# Patient Record
Sex: FEMALE | Race: WHITE | NOT HISPANIC OR LATINO | Employment: FULL TIME | URBAN - METROPOLITAN AREA
[De-identification: names, ages, dates, MRNs, and addresses within clinical notes are randomized per-mention and may not be internally consistent; named-entity substitution may affect disease eponyms.]

---

## 2023-10-23 ENCOUNTER — OFFICE VISIT (OUTPATIENT)
Dept: FAMILY MEDICINE CLINIC | Facility: CLINIC | Age: 33
End: 2023-10-23
Payer: COMMERCIAL

## 2023-10-23 VITALS
BODY MASS INDEX: 28.89 KG/M2 | DIASTOLIC BLOOD PRESSURE: 75 MMHG | HEIGHT: 62 IN | HEART RATE: 70 BPM | SYSTOLIC BLOOD PRESSURE: 126 MMHG | OXYGEN SATURATION: 99 % | WEIGHT: 157 LBS

## 2023-10-23 DIAGNOSIS — R73.03 PREDIABETES: ICD-10-CM

## 2023-10-23 DIAGNOSIS — Z23 ENCOUNTER FOR IMMUNIZATION: ICD-10-CM

## 2023-10-23 DIAGNOSIS — E78.2 MODERATE MIXED HYPERLIPIDEMIA NOT REQUIRING STATIN THERAPY: ICD-10-CM

## 2023-10-23 DIAGNOSIS — I10 MILD HYPERTENSION: ICD-10-CM

## 2023-10-23 DIAGNOSIS — G43.009 MIGRAINE WITHOUT AURA AND WITHOUT STATUS MIGRAINOSUS, NOT INTRACTABLE: ICD-10-CM

## 2023-10-23 DIAGNOSIS — Z76.89 ENCOUNTER TO ESTABLISH CARE WITH NEW DOCTOR: ICD-10-CM

## 2023-10-23 DIAGNOSIS — R53.83 OTHER FATIGUE: Primary | ICD-10-CM

## 2023-10-23 PROBLEM — L03.891: Status: RESOLVED | Noted: 2018-01-30 | Resolved: 2023-10-23

## 2023-10-23 PROBLEM — F41.8 ANXIETY WITH DEPRESSION: Status: RESOLVED | Noted: 2019-02-05 | Resolved: 2023-10-23

## 2023-10-23 NOTE — ASSESSMENT & PLAN NOTE
Blood pressure normotensive today, prior hx of elevated BP. Not currently on anti-HTN medications. Goal blood pressure is less than 140/90.   - pt advised to get automatic arm cuff and checking blood pressure every 2-3 days at home. Counseled to place the cuff on bare skin, rest arm at the level of the heart, keep legs uncrossed and feet flat on the ground. Do not talk while blood pressure is being checked.   - If BP consistently >140/90 will need HTN med initiation

## 2023-10-23 NOTE — ASSESSMENT & PLAN NOTE
No red flags on history or exam today. Discussed diet and lifestyle modifications.  Repeat labs pending

## 2023-10-23 NOTE — PATIENT INSTRUCTIONS
Today: Update screening labs and vaccinations. Can schedule pap with me.     Meds: none    Referrals: none    Imaging: none    Healthy lifestyle tips  - Reduce intake of processed food (shop perimeter of grocery store), especially white flour and sugar  - Increase intake of fruits/veggies  - Drink 32-64oz of water a day  - Quit smoking if you smoke  - Drink no more than 2 alcoholic beverages/day if you are male, no more than 1 alcoholic beverage/day if you are female  - Get 6-8 hours of restful sleep at night- poor sleep quality has been linked to increased risk of cardiovascular disease  - Voluntary physical activity cumulative 120-150 minutes/week  - Stress management utilizing meditation and mindfulness (InsightTimer, free chito)  - Keep blood pressure < 140/90. Get automatic arm cuff and check 2-3x weekly and keep log.     Heart healthy diet from AHA: https://www.heart.org/en/healthy-living/healthy-eating/eat-smart/nutrition-basics/aha-diet-and-lifestyle-recommendations

## 2023-10-23 NOTE — ASSESSMENT & PLAN NOTE
Reviewed chart, patient was never advised of abnormal A1c last year.  Will repeat A1c and discussed with patient if persistently elevated.  Diet and exercise guidance provided in AVS.

## 2023-10-24 DIAGNOSIS — E11.65 TYPE 2 DIABETES MELLITUS WITH HYPERGLYCEMIA, WITHOUT LONG-TERM CURRENT USE OF INSULIN: ICD-10-CM

## 2023-10-24 DIAGNOSIS — E78.2 MODERATE MIXED HYPERLIPIDEMIA NOT REQUIRING STATIN THERAPY: Primary | ICD-10-CM

## 2023-10-24 DIAGNOSIS — R79.9 ABNORMAL BLOOD CHEMISTRY TEST: ICD-10-CM

## 2023-10-24 PROBLEM — E55.9 VITAMIN D DEFICIENCY: Status: ACTIVE | Noted: 2023-10-24

## 2023-10-24 PROBLEM — R73.03 PREDIABETES: Status: RESOLVED | Noted: 2023-10-23 | Resolved: 2023-10-24

## 2023-10-24 LAB
25(OH)D3+25(OH)D2 SERPL-MCNC: 23.3 NG/ML (ref 30–100)
ALBUMIN SERPL-MCNC: 4.6 G/DL (ref 3.9–4.9)
ALBUMIN/GLOB SERPL: 1.7 {RATIO} (ref 1.2–2.2)
ALP SERPL-CCNC: 68 IU/L (ref 44–121)
ALT SERPL-CCNC: 12 IU/L (ref 0–32)
AST SERPL-CCNC: 12 IU/L (ref 0–40)
BASOPHILS # BLD AUTO: 0 X10E3/UL (ref 0–0.2)
BASOPHILS NFR BLD AUTO: 1 %
BILIRUB SERPL-MCNC: 0.4 MG/DL (ref 0–1.2)
BUN SERPL-MCNC: 11 MG/DL (ref 6–20)
BUN/CREAT SERPL: 15 (ref 9–23)
CALCIUM SERPL-MCNC: 9.2 MG/DL (ref 8.7–10.2)
CHLORIDE SERPL-SCNC: 103 MMOL/L (ref 96–106)
CHOLEST SERPL-MCNC: 204 MG/DL (ref 100–199)
CO2 SERPL-SCNC: 24 MMOL/L (ref 20–29)
CREAT SERPL-MCNC: 0.75 MG/DL (ref 0.57–1)
EGFRCR SERPLBLD CKD-EPI 2021: 107 ML/MIN/1.73
EOSINOPHIL # BLD AUTO: 0.2 X10E3/UL (ref 0–0.4)
EOSINOPHIL NFR BLD AUTO: 3 %
ERYTHROCYTE [DISTWIDTH] IN BLOOD BY AUTOMATED COUNT: 11.8 % (ref 11.7–15.4)
FERRITIN SERPL-MCNC: 28 NG/ML (ref 15–150)
GLOBULIN SER CALC-MCNC: 2.7 G/DL (ref 1.5–4.5)
GLUCOSE SERPL-MCNC: 141 MG/DL (ref 70–99)
HBA1C MFR BLD: 6.5 % (ref 4.8–5.6)
HCT VFR BLD AUTO: 38.3 % (ref 34–46.6)
HDLC SERPL-MCNC: 66 MG/DL
HGB BLD-MCNC: 13 G/DL (ref 11.1–15.9)
IMM GRANULOCYTES # BLD AUTO: 0 X10E3/UL (ref 0–0.1)
IMM GRANULOCYTES NFR BLD AUTO: 0 %
LDLC SERPL CALC-MCNC: 129 MG/DL (ref 0–99)
LYMPHOCYTES # BLD AUTO: 1.8 X10E3/UL (ref 0.7–3.1)
LYMPHOCYTES NFR BLD AUTO: 28 %
MCH RBC QN AUTO: 30.7 PG (ref 26.6–33)
MCHC RBC AUTO-ENTMCNC: 33.9 G/DL (ref 31.5–35.7)
MCV RBC AUTO: 90 FL (ref 79–97)
MONOCYTES # BLD AUTO: 0.5 X10E3/UL (ref 0.1–0.9)
MONOCYTES NFR BLD AUTO: 8 %
NEUTROPHILS # BLD AUTO: 3.8 X10E3/UL (ref 1.4–7)
NEUTROPHILS NFR BLD AUTO: 60 %
PLATELET # BLD AUTO: 277 X10E3/UL (ref 150–450)
POTASSIUM SERPL-SCNC: 4.4 MMOL/L (ref 3.5–5.2)
PROT SERPL-MCNC: 7.3 G/DL (ref 6–8.5)
RBC # BLD AUTO: 4.24 X10E6/UL (ref 3.77–5.28)
SODIUM SERPL-SCNC: 141 MMOL/L (ref 134–144)
TRIGL SERPL-MCNC: 50 MG/DL (ref 0–149)
TSH SERPL DL<=0.005 MIU/L-ACNC: 2.72 UIU/ML (ref 0.45–4.5)
VLDLC SERPL CALC-MCNC: 9 MG/DL (ref 5–40)
WBC # BLD AUTO: 6.3 X10E3/UL (ref 3.4–10.8)

## 2023-10-24 RX ORDER — METFORMIN HYDROCHLORIDE 500 MG/1
500 TABLET, EXTENDED RELEASE ORAL 2 TIMES DAILY
Qty: 180 TABLET | Refills: 3 | Status: SHIPPED | OUTPATIENT
Start: 2023-10-24

## 2023-10-24 NOTE — PROGRESS NOTES
Hello!    Here are the results of your most recent labs:    Your CBC and Thyroid Function was all normal.     Your vit D, ferritin, Cholesterol, Comprehensive Metabolic Panel, and Hgb A1C was abnormal.     I recommend supplementing with OTC vitamin D 1000 IU daily for 3-6 months, then take 400 IU daily or a prenatal vitamin.     Your ferritin was 28, goal is 50-75. I recommend oral iron supplementation for 12-16 weeks. You can get OTC iron 325mg (the bottle may say 65mg elemental iron), any brand is ok, and take one every day. I recommend taking iron with a vitamin C containing beverage such as orange juice to enhance absorption. I fpossible, avoid dairy, Tums, and coffee/tea around when you take iron as they may decrease absorption. Common side effects of iron supplementation include some stomach upset and constipation. Be sure to drink enough water and eat enough fiber when taking iron, you may use miralax if needed for constipation. Your poop may be dark green while taking iron, that is normal. You should notice an improvement within 6-8 weeks of daily iron supplementation as it takes a bit for the body to rebuild the stores it needs. If you do not notice an improvement, please let me know and we can repeat labs.      Your cholesterol was elevated.  For diet and lifestyle intervention, I recommend decreasing intake of trans and saturated fats, and red meat.  Increase physical activity as tolerated.  You may try supplementing with omega-3 1-2g daily, berberine 500mg daily, and/or whole flaxseed if desired.    Your A1C (screening test for diabetes) was elevated, indicating that you have diabetes. It was in the diabetic range last year as well. For diet and lifestyle interventions I recommend decreasing carbohydrate intake to 100-150 g/day, reducing processed food, increasing fruit and vegetable intake, at least 120 minutes of physical activity per week as tolerated, and controlling blood pressure with a goal of  less than 120/80. I recommend starting metformin 500mg twice daily to control your blood sugar. Med ordered. Your blood sugar was elevated on your CMP, consistent with diabetes.     Follow-up in 3 months with repeat of labs prior to appointment so we can go over them, ordered bloodwork to be done at our [place] facility by the clinic- please be fasting (no food after midnight, water/meds/black coffee ok).       Please continue your current medications.  Please contact me with any questions.    Thank you!  Dr. Fairchild

## 2023-10-27 ENCOUNTER — PATIENT ROUNDING (BHMG ONLY) (OUTPATIENT)
Dept: FAMILY MEDICINE CLINIC | Facility: CLINIC | Age: 33
End: 2023-10-27
Payer: COMMERCIAL

## 2023-10-27 NOTE — PROGRESS NOTES
Good morning Ms. Justin. My name is Nilsa and I am the practice manager for Dr. Fairchild's office.   I hope your recent visit with our office went smoothly.  If you  have questions or concerns you may reach me at [phone].     Thank you and have a great day!

## 2024-03-08 ENCOUNTER — OFFICE VISIT (OUTPATIENT)
Dept: FAMILY MEDICINE CLINIC | Facility: CLINIC | Age: 34
End: 2024-03-08
Payer: COMMERCIAL

## 2024-03-08 VITALS
OXYGEN SATURATION: 98 % | WEIGHT: 151 LBS | DIASTOLIC BLOOD PRESSURE: 84 MMHG | HEIGHT: 62 IN | HEART RATE: 66 BPM | SYSTOLIC BLOOD PRESSURE: 119 MMHG | BODY MASS INDEX: 27.79 KG/M2

## 2024-03-08 DIAGNOSIS — E78.2 MODERATE MIXED HYPERLIPIDEMIA NOT REQUIRING STATIN THERAPY: ICD-10-CM

## 2024-03-08 DIAGNOSIS — E55.9 VITAMIN D DEFICIENCY: ICD-10-CM

## 2024-03-08 DIAGNOSIS — E11.65 TYPE 2 DIABETES MELLITUS WITH HYPERGLYCEMIA, WITHOUT LONG-TERM CURRENT USE OF INSULIN: Primary | ICD-10-CM

## 2024-03-08 DIAGNOSIS — Z01.419 ENCOUNTER FOR ROUTINE GYNECOLOGICAL EXAMINATION WITH PAPANICOLAOU SMEAR OF CERVIX: ICD-10-CM

## 2024-03-08 DIAGNOSIS — I10 MILD HYPERTENSION: ICD-10-CM

## 2024-03-08 DIAGNOSIS — Z23 ENCOUNTER FOR IMMUNIZATION: ICD-10-CM

## 2024-03-08 LAB
ACYLCARNITINE/C0 UR-RTO: NORMAL {RATIO}
EXPIRATION DATE: NORMAL
Lab: NORMAL
POC CREATININE URINE: NORMAL
POC MICROALBUMIN URINE: NORMAL

## 2024-03-08 NOTE — PROGRESS NOTES
Chief Complaint  Chief Complaint   Patient presents with    Gynecologic Exam    Diabetes    Hypertension    Hyperlipidemia       Subjective    History of Present Illness  Brittney Justin is a 33 y.o. female presents to [facility] for well woman exam.  There are no acute concerns today.   The patient is sexually active with male partners. There is no pain or bleeding with intercourse.  The current method of family planning is vasectomy.  Patient has been pregnant 1 times. Patient has given birth 1 times. There were no complications.  There is no incontinence with laughing/sneezing/running/jumping.  There is no pelvic pain.   There is no vaginal discharge.   There is no dysuria.  There is no abnormal breast lumps or discharge. Patient is doing self-exams.          No LMP recorded.    Current Outpatient Medications on File Prior to Visit   Medication Sig Dispense Refill    metFORMIN ER (GLUCOPHAGE-XR) 500 MG 24 hr tablet Take 1 tablet by mouth 2 (Two) Times a Day. Indications: Type 2 Diabetes 180 tablet 3     No current facility-administered medications on file prior to visit.       Patient Active Problem List   Diagnosis    Mild hypertension    Migraine without aura and without status migrainosus, not intractable    Moderate mixed hyperlipidemia not requiring statin therapy    Other fatigue    Type 2 diabetes mellitus with hyperglycemia, without long-term current use of insulin    Vitamin D deficiency       Past Medical History:   Diagnosis Date    Acute lymphangitis of scalp     Anxiety with depression     During divorce, many years, took meds but don't remember which ones    Prediabetes     Prediabetic A1c x 3 years, last A1c 6.6 in , was never told she has diabetes       Past Surgical History:   Procedure Laterality Date    WISDOM TOOTH EXTRACTION Bilateral        Family History   Problem Relation Age of Onset    No Known Problems Mother     Alcohol abuse Father          57     Other Daughter         Rett syndrome    Breast cancer Paternal Aunt 60    Dementia Paternal Grandmother     Heart disease Paternal Grandfather        Health Maintenance Summary            Ordered - PAP SMEAR (Every 3 Years) Ordered on 3/8/2024      10/23/2023  Postponed until 12/15/2023 by Susan Fairchild MD (Pending event - scheduled)              Ordered - DIABETIC EYE EXAM (Yearly) Ordered on 3/8/2024      No completion, postpone, or frequency change history exists for this topic.              Postponed - Hepatitis B (1 of 3 - 19+ 3-dose series) Postponed until 3/8/2024      03/08/2024  Postponed until 3/8/2024 by Susan Fairchild MD (Pending event)              Postponed - COVID-19 Vaccine (2 - 2023-24 season) Postponed until 5/28/2024 03/08/2024  Postponed until 5/28/2024 by Susan Fairchild MD (Product Unavailable)    10/23/2023  Postponed until 1/12/2024 by Susan Fairchild MD (Product Unavailable)    01/10/2022  Imm Admin: COVID-19 (PFIZER) Purple Cap Monovalent              Ordered - HEMOGLOBIN A1C (Every 6 Months) Ordered on 3/8/2024      10/23/2023  Hemoglobin A1C component of Hemoglobin A1c    11/10/2022  Hemoglobin A1C component of HEMOGLOBIN A1C    08/17/2021  Hemoglobin A1C component of HEMOGLOBIN A1C    10/29/2019  Hemoglobin A1C component of Hemoglobin A1c    02/12/2019  Hemoglobin A1c    Only the first 5 history entries have been loaded, but more history exists.              ANNUAL PHYSICAL (Yearly) Next due on 10/23/2024      10/23/2023  Done              Ordered - LIPID PANEL (Yearly) Ordered on 3/8/2024      10/23/2023  Lipid Panel    11/10/2022  LIPID PANEL    08/17/2021  LIPID PANEL    02/12/2019  Lipid Panel    10/13/2018  Lipid Panel    Only the first 5 history entries have been loaded, but more history exists.              BMI FOLLOWUP (Yearly) Next due on 10/23/2024      10/23/2023  SmartData: BMI EDUCATION FOR OVERWEIGHT    10/23/2023  SmartData: WORKFLOW - QUALITY MEASUREMENT -  "DOCUMENTED WEIGHT FOLLOW-UP PLAN              DIABETIC FOOT EXAM (Yearly) Next due on 3/8/2025      03/08/2024  Done              URINE MICROALBUMIN (Yearly) Next due on 3/8/2025      03/08/2024  Microalbumin, Urine component of POC Microalbumin              TDAP/TD VACCINES (2 - Td or Tdap) Next due on 8/11/2031 08/11/2021  Imm Admin: Tdap              HEPATITIS C SCREENING  Completed      10/13/2017  Hepatitis Panel, Acute              INFLUENZA VACCINE  Completed      10/23/2023  Imm Admin: Fluzone (or Fluarix & Flulaval for VFC) >6mos    11/10/2022  Imm Admin: Fluzone (or Fluarix & Flulaval for VFC) >6mos    10/29/2019  Imm Admin: flucelvax quad pfs =>4 YRS              Pneumococcal Vaccine 0-64 (Series Information) Completed      03/08/2024  Postponed until 3/8/2024 by Susan Fairchild MD (Pending event)    03/08/2024  Imm Admin: Pneumococcal Conjugate 20-Valent (PCV20)                     Objective   Vitals:    03/08/24 0845   BP: 119/84   Pulse: 66   SpO2: 98%   Weight: 68.5 kg (151 lb)   Height: 157.5 cm (62.01\")      Physical exam  HEENT normal. PERRLA. MMM. Neck supple. Normal respiratory effort. Well perfused. Abdomen soft. Extremities show no edema, normal range of motion. Normal gait, no focal neurological findings.    BREAST EXAM: risk and benefit of breast self-exam was discussed, not examined    PELVIC EXAM: normal external genitalia, vulva, vagina, cervix with cyst at 12 o clock; PAP: Pap smear done today, thin-prep method    Diabetic foot exam:   Left: Filament test present 10/10   Right: Filament test present 10/10      The following data was reviewed by: Susan Fairchild MD on 03/08/2024:  CMP          10/23/2023    00:00   CMP   Glucose 141    BUN 11    Creatinine 0.75    Sodium 141    Potassium 4.4    Chloride 103    Calcium 9.2    Total Protein 7.3    Albumin 4.6    Globulin 2.7    Total Bilirubin 0.4    Alkaline Phosphatase 68    AST (SGOT) 12    ALT (SGPT) 12    BUN/Creatinine Ratio 15  "     CBC          10/23/2023    00:00   CBC   WBC 6.3    RBC 4.24    Hemoglobin 13.0    Hematocrit 38.3    MCV 90    MCH 30.7    MCHC 33.9    RDW 11.8    Platelets 277      Lipid Panel          10/23/2023    00:00   Lipid Panel   Total Cholesterol 204    Triglycerides 50    HDL Cholesterol 66    VLDL Cholesterol 9    LDL Cholesterol  129      TSH          10/23/2023    00:00   TSH   TSH 2.720      Most Recent A1C          10/23/2023    00:00   HGBA1C Most Recent   Hemoglobin A1C 6.5        Last PCP note      Assessment and Plan  Brittney Justin is a 33 y.o. female presents to [facility] today for well woman, chart reviewed and updated, medications reviewed, labs reviewed, preventative med reviewed. Pt is currently doing well, no concerning findings on history or exam. Answered all questions at this time, pt expressed no further concerns today.      Preventative medicine:   HIV Screen - N/A  STI screening: N/A  Immunizations UTD: No- due next COVID and Hep B series; will defer  Tobacco cessation counseling: N/A  Pap: Up to date.  Pending result. Next pap due in 5 years in accordance with ASCCP guidelines if normal today.   Mammogram:  age 38    Osteoporosis Screen:  age 63      Diagnoses and all orders for this visit:    1. Type 2 diabetes mellitus with hyperglycemia, without long-term current use of insulin (Primary)  Assessment & Plan:   A1c remains below goal on current regimen.  Well controlled.  There are no diabetic complications.    - continue current DM management  - Strongly advised being proactive in preventing progression, including continuing to improve diet (including decreasing carbs, monitoring calories, increasing unprocessed fresh foods, and avoiding processed sugar and flour) and goal of 120 minutes of physical activity weekly  - Discussed modifiable risk factors (BP<140/90, fasting BG , HgA1C<7, LDL<100, BMI 20-25)  - Discussed need for annual ophthalmology exam for  surveillance of retinopathy; and need for annual as well as twice daily foot exams for neuropathy surveillance  - f/u in clinic 6-12 months for re-check, with repeat of labs at that time.     Above plan was individualized and constructed with patient input and agreement    last ophthalmology exam -  ordered  microalbumin screen -  ordered  lipid panel - done in last 12 mo, LDL is not below 70  Hgb A1C -  goal for this patient is below 7%  foot exam - was done and was NEGATIVE   ACE Inhibitor/ARB - N/A  Statin- N/A  Flu shot- up to date and documented  Pneumovax- up to date and documented  Aspirin- Yes and N/A    Orders:  -     Pneumococcal Conjugate Vaccine 20-Valent All  -     CBC & Differential  -     Comprehensive Metabolic Panel  -     Hemoglobin A1c  -     POC Microalbumin  -     Ambulatory Referral for Diabetic Eye Exam-Optometry    2. Moderate mixed hyperlipidemia not requiring statin therapy  Assessment & Plan:  Elevated cholesterol on labs. No red flags on history or exam.  No statin indicated. Discussed diet and lifestyle modifications. Recheck lipid pending    Orders:  -     Lipid Panel    3. Mild hypertension  Overview:  Patient reports she has had intermittently high blood pressure.  But has never been formally diagnosed with hypertension.  Is not checking blood pressure at home    Assessment & Plan:  BP normotensive today, will cont to monitor    Orders:  -     Comprehensive Metabolic Panel  -     POC Microalbumin    4. Vitamin D deficiency  -     Vitamin D,25-Hydroxy    5. Encounter for immunization  -     Pneumococcal Conjugate Vaccine 20-Valent All    6. Encounter for routine gynecological examination with Papanicolaou smear of cervix  -     IGP, Aptima HPV, Rfx 16 / 18,45        Patient voiced understanding and agreement with plan of care and had no further questions or concerns at this time.     Susan Fairchild MD  Family Medicine  [facility]      Follow Up  Return in about 6  months (around 9/8/2024) for Annual.    Patient Instructions   Today: Update labs and vaccination; if pap is normal, you will be due in 5 years    Meds: No changes    Referrals: optometry- diabetic eye exam    Imaging: none    HTN plan  - The symptoms of organ damage from hypertension are very subtle until there is significant damage, so prevention is key through lifestyle modifications: reduce salt intake to less than 1500mg daily (about a teaspoon), , increase intake of unprocessed fresh foods, avoid processed sugar and flour, work up to 120 minutes of exercise weekly as tolerated. Consider trying the DASH diet, or Mediterranean diet  - Goal BP: <140/90  - go to the optometrist every year to monitor for retinopathy  -  Check BP every day with automatic arm cuff. Ensure BP cuff is on bare skin, you are seated with feet flat on ground and legs uncrossed, the arm is at the level of the heart, and you are not talking during the BP measurement. Keep a log of your BP and bring it to your next appointment.     Check out https://www.heart.org/en/health-topics/high-blood-pressure    Diabetes plan:   - be proactive in preventing progression:  continuing to improve diet (decreasing carbs to goal 100-120g/day, monitoring calories, increasing unprocessed fresh foods, and avoiding processed sugar and flour) and 120 minutes of exercise weekly.  - Goal BP <140/90,   - Goal fasting BG   - Goal A1C  <7  - Goal LDL <80  - Ensure annual ophthalmology exam for surveillance of retinopathy  - twice daily foot exams for neuropathy surveillance    Check out [email or web address].[email or web address]

## 2024-03-08 NOTE — ASSESSMENT & PLAN NOTE
Elevated cholesterol on labs. No red flags on history or exam.  No statin indicated. Discussed diet and lifestyle modifications. Recheck lipid pending

## 2024-03-08 NOTE — ASSESSMENT & PLAN NOTE
A1c remains below goal on current regimen.  Well controlled.  There are no diabetic complications.    - continue current DM management  - Strongly advised being proactive in preventing progression, including continuing to improve diet (including decreasing carbs, monitoring calories, increasing unprocessed fresh foods, and avoiding processed sugar and flour) and goal of 120 minutes of physical activity weekly  - Discussed modifiable risk factors (BP<140/90, fasting BG , HgA1C<7, LDL<100, BMI 20-25)  - Discussed need for annual ophthalmology exam for surveillance of retinopathy; and need for annual as well as twice daily foot exams for neuropathy surveillance  - f/u in clinic 6-12 months for re-check, with repeat of labs at that time.     Above plan was individualized and constructed with patient input and agreement    last ophthalmology exam -  ordered  microalbumin screen -  ordered  lipid panel - done in last 12 mo, LDL is not below 70  Hgb A1C -  goal for this patient is below 7%  foot exam - was done and was NEGATIVE   ACE Inhibitor/ARB - N/A  Statin- N/A  Flu shot- up to date and documented  Pneumovax- up to date and documented  Aspirin- Yes and N/A

## 2024-03-08 NOTE — PATIENT INSTRUCTIONS
Today: Update labs and vaccination; if pap is normal, you will be due in 5 years    Meds: No changes    Referrals: optometry- diabetic eye exam    Imaging: none    HTN plan  - The symptoms of organ damage from hypertension are very subtle until there is significant damage, so prevention is key through lifestyle modifications: reduce salt intake to less than 1500mg daily (about a teaspoon), , increase intake of unprocessed fresh foods, avoid processed sugar and flour, work up to 120 minutes of exercise weekly as tolerated. Consider trying the DASH diet, or Mediterranean diet  - Goal BP: <140/90  - go to the optometrist every year to monitor for retinopathy  -  Check BP every day with automatic arm cuff. Ensure BP cuff is on bare skin, you are seated with feet flat on ground and legs uncrossed, the arm is at the level of the heart, and you are not talking during the BP measurement. Keep a log of your BP and bring it to your next appointment.     Check out https://www.heart.org/en/health-topics/high-blood-pressure    Diabetes plan:   - be proactive in preventing progression:  continuing to improve diet (decreasing carbs to goal 100-120g/day, monitoring calories, increasing unprocessed fresh foods, and avoiding processed sugar and flour) and 120 minutes of exercise weekly.  - Goal BP <140/90,   - Goal fasting BG   - Goal A1C  <7  - Goal LDL <80  - Ensure annual ophthalmology exam for surveillance of retinopathy  - twice daily foot exams for neuropathy surveillance    Check out [email or web address].[email or web address]

## 2024-03-09 LAB
25(OH)D3+25(OH)D2 SERPL-MCNC: 22.6 NG/ML (ref 30–100)
ALBUMIN SERPL-MCNC: 4.5 G/DL (ref 3.5–5.2)
ALBUMIN/GLOB SERPL: 2 G/DL
ALP SERPL-CCNC: 71 U/L (ref 39–117)
ALT SERPL-CCNC: 13 U/L (ref 1–33)
AST SERPL-CCNC: 14 U/L (ref 1–32)
BASOPHILS # BLD AUTO: 0.02 10*3/MM3 (ref 0–0.2)
BASOPHILS NFR BLD AUTO: 0.4 % (ref 0–1.5)
BILIRUB SERPL-MCNC: 0.5 MG/DL (ref 0–1.2)
BUN SERPL-MCNC: 11 MG/DL (ref 6–20)
BUN/CREAT SERPL: 14.9 (ref 7–25)
CALCIUM SERPL-MCNC: 9 MG/DL (ref 8.6–10.5)
CHLORIDE SERPL-SCNC: 102 MMOL/L (ref 98–107)
CHOLEST SERPL-MCNC: 185 MG/DL (ref 0–200)
CO2 SERPL-SCNC: 24.3 MMOL/L (ref 22–29)
CREAT SERPL-MCNC: 0.74 MG/DL (ref 0.57–1)
EGFRCR SERPLBLD CKD-EPI 2021: 108.4 ML/MIN/1.73
EOSINOPHIL # BLD AUTO: 0.14 10*3/MM3 (ref 0–0.4)
EOSINOPHIL NFR BLD AUTO: 3.1 % (ref 0.3–6.2)
ERYTHROCYTE [DISTWIDTH] IN BLOOD BY AUTOMATED COUNT: 12 % (ref 12.3–15.4)
GLOBULIN SER CALC-MCNC: 2.2 GM/DL
GLUCOSE SERPL-MCNC: 122 MG/DL (ref 65–99)
HBA1C MFR BLD: 6.4 % (ref 4.8–5.6)
HCT VFR BLD AUTO: 38.6 % (ref 34–46.6)
HDLC SERPL-MCNC: 63 MG/DL (ref 40–60)
HGB BLD-MCNC: 12.6 G/DL (ref 12–15.9)
IMM GRANULOCYTES # BLD AUTO: 0 10*3/MM3 (ref 0–0.05)
IMM GRANULOCYTES NFR BLD AUTO: 0 % (ref 0–0.5)
LDLC SERPL CALC-MCNC: 112 MG/DL (ref 0–100)
LYMPHOCYTES # BLD AUTO: 1.63 10*3/MM3 (ref 0.7–3.1)
LYMPHOCYTES NFR BLD AUTO: 36.4 % (ref 19.6–45.3)
MCH RBC QN AUTO: 28.9 PG (ref 26.6–33)
MCHC RBC AUTO-ENTMCNC: 32.6 G/DL (ref 31.5–35.7)
MCV RBC AUTO: 88.5 FL (ref 79–97)
MONOCYTES # BLD AUTO: 0.36 10*3/MM3 (ref 0.1–0.9)
MONOCYTES NFR BLD AUTO: 8 % (ref 5–12)
NEUTROPHILS # BLD AUTO: 2.33 10*3/MM3 (ref 1.7–7)
NEUTROPHILS NFR BLD AUTO: 52.1 % (ref 42.7–76)
NRBC BLD AUTO-RTO: 0 /100 WBC (ref 0–0.2)
PLATELET # BLD AUTO: 298 10*3/MM3 (ref 140–450)
POTASSIUM SERPL-SCNC: 4.5 MMOL/L (ref 3.5–5.2)
PROT SERPL-MCNC: 6.7 G/DL (ref 6–8.5)
RBC # BLD AUTO: 4.36 10*6/MM3 (ref 3.77–5.28)
SODIUM SERPL-SCNC: 137 MMOL/L (ref 136–145)
TRIGL SERPL-MCNC: 53 MG/DL (ref 0–150)
VLDLC SERPL CALC-MCNC: 10 MG/DL (ref 5–40)
WBC # BLD AUTO: 4.48 10*3/MM3 (ref 3.4–10.8)

## 2024-03-09 NOTE — PROGRESS NOTES
"Hello!    Here are the results of your most recent labs:    Your CBC, Kidney Profile, and Liver Panel was all normal.     Your A1C (3 month average of your blood sugar) is at goal. Keep up the good work! and Your urine microalbumin (test for damage to the kidneys for diabetes) was normal.      For diet and lifestyle interventions I recommend decreasing carbohydrate intake to 100-150 g/day, reducing processed food, increasing fruit and vegetable intake, at least 120 minutes of physical activity per week as tolerated, and controlling blood pressure with a goal of less than 120/80.     Your vitamin D and Cholesterol was abnormal.     I recommend supplementing with OTC vitamin D 1000 IU daily for 3-6 months, then take 400 IU daily or a prenatal vitamin.     Your cholesterol was better than when we checked it last, but still elevated.  For diet and lifestyle intervention, I recommend decreasing intake of trans and saturated fats, and red meat.  Increase physical activity as tolerated.  You may try supplementing with omega-3 1-2g daily, berberine 500mg daily, and/or whole flaxseed if desired.    For people with diabetes, guidelines recommend an LDL (the \"bad cholesterol\") of less than 80 to reduce the risk of heart attack and stroke. Your is 112. Do you want to try working on diet and exercise for 3 months and then repeat the lab, or would you like to start a low dose cholesterol medicine now?    Please continue your current medications.  Please contact me with any questions.    Thank you!  Dr. Fairchild  "

## 2024-03-11 DIAGNOSIS — E11.65 TYPE 2 DIABETES MELLITUS WITH HYPERGLYCEMIA, WITHOUT LONG-TERM CURRENT USE OF INSULIN: ICD-10-CM

## 2024-03-11 DIAGNOSIS — E55.9 VITAMIN D DEFICIENCY: Primary | ICD-10-CM

## 2024-03-11 DIAGNOSIS — E78.2 MODERATE MIXED HYPERLIPIDEMIA NOT REQUIRING STATIN THERAPY: ICD-10-CM

## 2024-03-13 LAB
CYTOLOGIST CVX/VAG CYTO: NORMAL
CYTOLOGY CVX/VAG DOC CYTO: NORMAL
CYTOLOGY CVX/VAG DOC THIN PREP: NORMAL
DX ICD CODE: NORMAL
HPV GENOTYPE REFLEX: NORMAL
HPV I/H RISK 4 DNA CVX QL PROBE+SIG AMP: NEGATIVE
Lab: NORMAL
Lab: NORMAL
OTHER STN SPEC: NORMAL
STAT OF ADQ CVX/VAG CYTO-IMP: NORMAL

## 2024-06-10 ENCOUNTER — OFFICE VISIT (OUTPATIENT)
Dept: FAMILY MEDICINE CLINIC | Facility: CLINIC | Age: 34
End: 2024-06-10
Payer: COMMERCIAL

## 2024-06-10 VITALS
DIASTOLIC BLOOD PRESSURE: 79 MMHG | SYSTOLIC BLOOD PRESSURE: 127 MMHG | RESPIRATION RATE: 16 BRPM | BODY MASS INDEX: 28.34 KG/M2 | TEMPERATURE: 98.4 F | WEIGHT: 154 LBS | OXYGEN SATURATION: 99 % | HEIGHT: 62 IN | HEART RATE: 91 BPM

## 2024-06-10 DIAGNOSIS — R31.29 MICROSCOPIC HEMATURIA: ICD-10-CM

## 2024-06-10 DIAGNOSIS — R35.0 FREQUENCY OF URINATION: ICD-10-CM

## 2024-06-10 DIAGNOSIS — R30.0 DYSURIA: Primary | ICD-10-CM

## 2024-06-10 LAB
BILIRUB BLD-MCNC: NEGATIVE MG/DL
CLARITY, POC: ABNORMAL
COLOR UR: ABNORMAL
EXPIRATION DATE: ABNORMAL
GLUCOSE UR STRIP-MCNC: NEGATIVE MG/DL
KETONES UR QL: ABNORMAL
LEUKOCYTE EST, POC: ABNORMAL
Lab: ABNORMAL
NITRITE UR-MCNC: NEGATIVE MG/ML
PH UR: 6.5 [PH] (ref 5–8)
PROT UR STRIP-MCNC: ABNORMAL MG/DL
RBC # UR STRIP: ABNORMAL /UL
SP GR UR: 1.02 (ref 1–1.03)
UROBILINOGEN UR QL: ABNORMAL

## 2024-06-10 PROCEDURE — 99213 OFFICE O/P EST LOW 20 MIN: CPT | Performed by: FAMILY MEDICINE

## 2024-06-10 PROCEDURE — 81003 URINALYSIS AUTO W/O SCOPE: CPT | Performed by: FAMILY MEDICINE

## 2024-06-10 RX ORDER — NITROFURANTOIN 25; 75 MG/1; MG/1
100 CAPSULE ORAL 2 TIMES DAILY
Qty: 14 CAPSULE | Refills: 0 | Status: SHIPPED | OUTPATIENT
Start: 2024-06-10

## 2024-06-10 NOTE — PATIENT INSTRUCTIONS
Take antibiotic as directed.  Continue increased fluids.  Culture sent.   Plan repeat urine at next visit to make sure blood has resolved.

## 2024-06-10 NOTE — PROGRESS NOTES
"Chief Complaint  Urinary Tract Infection    Subjective    History of Present Illness {  Problem List  Visit  Diagnosis   Encounters  Notes  Medications  Labs  Result Review Imaging  Media :23}     Brittney Justin presents to [facility] for Urinary Tract Infection.  History of Present Illness     She presents with 1 day history of increased dysuria and frequency. She has increased fluid intake and is slight better today. She denies any fever, nausea, vomiting or back pain.    Objective     Vital Signs:   /79 (BP Location: Left arm, Patient Position: Sitting, Cuff Size: Adult)   Pulse 91   Temp 98.4 °F (36.9 °C) (Oral)   Resp 16   Ht 157.5 cm (62\")   Wt 69.9 kg (154 lb)   SpO2 99%   BMI 28.17 kg/m²   Body mass index is 28.17 kg/m².     Physical Exam  Constitutional:       General: She is not in acute distress.  Cardiovascular:      Rate and Rhythm: Normal rate and regular rhythm.      Heart sounds: No murmur heard.  Pulmonary:      Effort: No respiratory distress.      Breath sounds: Normal breath sounds.   Abdominal:      Palpations: Abdomen is soft.      Tenderness: There is abdominal tenderness (mild suprapubic). There is no guarding or rebound.   Neurological:      General: No focal deficit present.      Mental Status: She is alert.          Result Review  Data Reviewed:{ Labs  Result Review  Imaging  Med Tab  Media :23}                Assessment and Plan { Problem List  Visit Diagnosis  ROS  Review (Popup)  [facility] Maintenance  Quality  BestPractice  Medications  SmartSets  SnapShot Encounters  Media :23}   Diagnoses and all orders for this visit:    1. Dysuria (Primary)  -     POCT urinalysis dipstick, automated  -     Urine Culture - Urine, Urine, Clean Catch    2. Frequency of urination  -     POCT urinalysis dipstick, automated  -     Urine Culture - Urine, Urine, Clean Catch    3. Microscopic hematuria  -     Urine Culture - Urine, Urine, " Clean Catch    Other orders  -     nitrofurantoin, macrocrystal-monohydrate, (Macrobid) 100 MG capsule; Take 1 capsule by mouth 2 (Two) Times a Day.  Dispense: 14 capsule; Refill: 0        Patient Instructions   Take antibiotic as directed.  Continue increased fluids.  Culture sent.   Plan repeat urine at next visit to make sure blood has resolved.     Patient was given instructions and counseling regarding her condition or for health maintenance advice on the AVS.       No follow-ups on file.    Stacia Wood MD

## 2024-06-15 LAB
BACTERIA UR CULT: ABNORMAL
BACTERIA UR CULT: ABNORMAL
OTHER ANTIBIOTIC SUSC ISLT: ABNORMAL

## 2024-07-01 ENCOUNTER — PATIENT MESSAGE (OUTPATIENT)
Dept: FAMILY MEDICINE CLINIC | Facility: CLINIC | Age: 34
End: 2024-07-01
Payer: COMMERCIAL

## 2024-07-31 ENCOUNTER — LAB (OUTPATIENT)
Dept: FAMILY MEDICINE CLINIC | Facility: CLINIC | Age: 34
End: 2024-07-31
Payer: COMMERCIAL

## 2024-07-31 DIAGNOSIS — E78.2 MODERATE MIXED HYPERLIPIDEMIA NOT REQUIRING STATIN THERAPY: ICD-10-CM

## 2024-07-31 DIAGNOSIS — E11.65 TYPE 2 DIABETES MELLITUS WITH HYPERGLYCEMIA, WITHOUT LONG-TERM CURRENT USE OF INSULIN: ICD-10-CM

## 2024-07-31 DIAGNOSIS — E55.9 VITAMIN D DEFICIENCY: ICD-10-CM

## 2024-08-01 LAB
25(OH)D3+25(OH)D2 SERPL-MCNC: 28.1 NG/ML (ref 30–100)
ALBUMIN SERPL-MCNC: 4.4 G/DL (ref 3.5–5.2)
ALBUMIN/GLOB SERPL: 1.8 G/DL
ALP SERPL-CCNC: 67 U/L (ref 39–117)
ALT SERPL-CCNC: 10 U/L (ref 1–33)
AST SERPL-CCNC: 13 U/L (ref 1–32)
BILIRUB SERPL-MCNC: 0.4 MG/DL (ref 0–1.2)
BUN SERPL-MCNC: 9 MG/DL (ref 6–20)
BUN/CREAT SERPL: 11.8 (ref 7–25)
CALCIUM SERPL-MCNC: 9.1 MG/DL (ref 8.6–10.5)
CHLORIDE SERPL-SCNC: 103 MMOL/L (ref 98–107)
CHOLEST SERPL-MCNC: 192 MG/DL (ref 0–200)
CO2 SERPL-SCNC: 26.8 MMOL/L (ref 22–29)
CREAT SERPL-MCNC: 0.76 MG/DL (ref 0.57–1)
EGFRCR SERPLBLD CKD-EPI 2021: 104.9 ML/MIN/1.73
GLOBULIN SER CALC-MCNC: 2.4 GM/DL
GLUCOSE SERPL-MCNC: 138 MG/DL (ref 65–99)
HBA1C MFR BLD: 6.6 % (ref 4.8–5.6)
HDLC SERPL-MCNC: 62 MG/DL (ref 40–60)
LDLC SERPL CALC-MCNC: 118 MG/DL (ref 0–100)
POTASSIUM SERPL-SCNC: 4.7 MMOL/L (ref 3.5–5.2)
PROT SERPL-MCNC: 6.8 G/DL (ref 6–8.5)
SODIUM SERPL-SCNC: 137 MMOL/L (ref 136–145)
TRIGL SERPL-MCNC: 66 MG/DL (ref 0–150)
VLDLC SERPL CALC-MCNC: 12 MG/DL (ref 5–40)

## 2024-09-12 ENCOUNTER — OFFICE VISIT (OUTPATIENT)
Dept: FAMILY MEDICINE CLINIC | Facility: CLINIC | Age: 34
End: 2024-09-12
Payer: COMMERCIAL

## 2024-09-12 VITALS
OXYGEN SATURATION: 100 % | WEIGHT: 155 LBS | SYSTOLIC BLOOD PRESSURE: 122 MMHG | BODY MASS INDEX: 28.52 KG/M2 | HEART RATE: 72 BPM | HEIGHT: 62 IN | DIASTOLIC BLOOD PRESSURE: 79 MMHG

## 2024-09-12 DIAGNOSIS — E55.9 VITAMIN D DEFICIENCY: ICD-10-CM

## 2024-09-12 DIAGNOSIS — Z01.84 IMMUNITY STATUS TESTING: ICD-10-CM

## 2024-09-12 DIAGNOSIS — G43.009 MIGRAINE WITHOUT AURA AND WITHOUT STATUS MIGRAINOSUS, NOT INTRACTABLE: ICD-10-CM

## 2024-09-12 DIAGNOSIS — Z23 ENCOUNTER FOR IMMUNIZATION: ICD-10-CM

## 2024-09-12 DIAGNOSIS — Z00.00 ANNUAL PHYSICAL EXAM: ICD-10-CM

## 2024-09-12 DIAGNOSIS — E78.2 MODERATE MIXED HYPERLIPIDEMIA NOT REQUIRING STATIN THERAPY: ICD-10-CM

## 2024-09-12 DIAGNOSIS — E11.65 TYPE 2 DIABETES MELLITUS WITH HYPERGLYCEMIA, WITHOUT LONG-TERM CURRENT USE OF INSULIN: Primary | ICD-10-CM

## 2024-09-12 DIAGNOSIS — M54.2 CERVICALGIA: ICD-10-CM

## 2024-09-12 DIAGNOSIS — G89.29 CHRONIC MIDLINE LOW BACK PAIN WITHOUT SCIATICA: ICD-10-CM

## 2024-09-12 DIAGNOSIS — I10 MILD HYPERTENSION: ICD-10-CM

## 2024-09-12 DIAGNOSIS — M54.50 CHRONIC MIDLINE LOW BACK PAIN WITHOUT SCIATICA: ICD-10-CM

## 2024-09-12 RX ORDER — ROSUVASTATIN CALCIUM 5 MG/1
5 TABLET, COATED ORAL DAILY
Qty: 90 TABLET | Refills: 3 | Status: SHIPPED | OUTPATIENT
Start: 2024-09-12

## 2024-09-12 RX ORDER — METFORMIN HCL 500 MG
500 TABLET, EXTENDED RELEASE 24 HR ORAL 2 TIMES DAILY
Qty: 180 TABLET | Refills: 3 | Status: SHIPPED | OUTPATIENT
Start: 2024-09-12

## 2024-09-12 NOTE — PROGRESS NOTES
Chief Complaint  Chief Complaint   Patient presents with    Diabetes    Annual Exam    Hyperlipidemia    Back Pain       Subjective    History of Present Illness  Brittney Justin is a 33 y.o. female presents to [facility] for annual exam.  History of Present Illness  She is still working as a manager for rental properties. She maintains an active lifestyle, enjoying outdoor activities such as hiking. She and her  walk daily and practice beginner yoga, which she finds beneficial for stress management. She has recently started a food journal to monitor her diet, which she admits needs improvement. She identifies as a stress eater. Her sleep is generally good, although she occasionally wakes up to check on her disabled daughter. Her mood is stable, and she reports feeling better overall. She has not had a dilated eye exam this year but has visited a dentist within the past year.    She recently experiences some back discomfort, which she attributes to physical requirements of caring for her daughter. This discomfort has been present for years but has recently improved. She also reports neck discomfort and a headache, which she believes may be due to improper movement while caregiving. She does not experience any radiating pain down the back of her leg or loss of bowel or bladder control.    She experiences migraines a few times a month, which she believes are hormonal in nature. She is currently taking metformin for diabetes and has completed a course of antibiotics for a urinary tract infection, which has resolved. She reports no new diagnoses or surgeries.      SOCIAL HISTORY  She does not smoke or drink alcohol. She is sexually active with her , who has undergone a vasectomy. She has had one pregnancy and has one child. Her menstrual cycles are regular.    FAMILY HISTORY  Her maternal grandfather had high blood pressure. Her maternal grandfather had heart disease. There is  no family history of high cholesterol or diabetes. Her maternal grandfather had colon cancer. Her aunt had breast cancer. There is no family history of heart attack or stroke.      Current Outpatient Medications on File Prior to Visit   Medication Sig Dispense Refill    [DISCONTINUED] metFORMIN ER (GLUCOPHAGE-XR) 500 MG 24 hr tablet Take 1 tablet by mouth 2 (Two) Times a Day. Indications: Type 2 Diabetes 180 tablet 3    [DISCONTINUED] nitrofurantoin, macrocrystal-monohydrate, (Macrobid) 100 MG capsule Take 1 capsule by mouth 2 (Two) Times a Day. (Patient not taking: Reported on 2024) 14 capsule 0     No current facility-administered medications on file prior to visit.       Patient Active Problem List   Diagnosis    Mild hypertension    Migraine without aura and without status migrainosus, not intractable    Moderate mixed hyperlipidemia not requiring statin therapy    Other fatigue    Type 2 diabetes mellitus with hyperglycemia, without long-term current use of insulin    Vitamin D deficiency       Past Medical History:   Diagnosis Date    Acute lymphangitis of scalp     Anxiety with depression     During divorce, many years, took meds but don't remember which ones    Prediabetes     Prediabetic A1c x 3 years, last A1c 6.6 in , was never told she has diabetes       Past Surgical History:   Procedure Laterality Date    WISDOM TOOTH EXTRACTION Bilateral        Family History   Problem Relation Age of Onset    Hypertension Mother     Alcohol abuse Father          57    Other (Rett Syndrome) Daughter     Breast cancer Paternal Aunt 60    Heart disease Maternal Grandfather     Colon cancer Maternal Grandfather     Dementia Paternal Grandmother     Hyperlipidemia Neg Hx     Diabetes Neg Hx     Stroke Neg Hx        Health Maintenance Summary            Ordered - DIABETIC EYE EXAM (Yearly) Ordered on 2024      No completion, postpone, or frequency change history exists for this topic.               Postponed - Hepatitis B (1 of 3 - 19+ 3-dose series) Postponed until 9/12/2024 09/12/2024  Postponed until 9/12/2024 by Susan Fairchild MD (Pending event - titers pending)    03/08/2024  Postponed until 3/8/2024 by Susan Fairchild MD (Pending event)              Postponed - COVID-19 Vaccine (2 - 2023-24 season) Postponed until 9/12/2025 09/12/2024  Postponed until 9/12/2025 by Susan Fairchild MD (Patient Refused)    03/08/2024  Postponed until 5/28/2024 by Susan Fairchild MD (Product Unavailable)    10/23/2023  Postponed until 1/12/2024 by Susan Fairchild MD (Product Unavailable)    01/10/2022  Imm Admin: COVID-19 (PFIZER) Purple Cap Monovalent              ANNUAL PHYSICAL (Yearly) Next due on 10/23/2024      10/23/2023  Done              BMI FOLLOWUP (Yearly) Next due on 10/23/2024      10/23/2023  SmartData: BMI EDUCATION FOR OVERWEIGHT    10/23/2023  SmartData: WORKFLOW - QUALITY MEASUREMENT - DOCUMENTED WEIGHT FOLLOW-UP PLAN              Ordered - HEMOGLOBIN A1C (Every 6 Months) Ordered on 9/12/2024 07/31/2024  Hemoglobin A1C component of Hemoglobin A1c    03/08/2024  Hemoglobin A1C component of Hemoglobin A1c    10/23/2023  Hemoglobin A1C component of Hemoglobin A1c    11/10/2022  Hemoglobin A1C component of HEMOGLOBIN A1C    08/17/2021  Hemoglobin A1C component of HEMOGLOBIN A1C    Only the first 5 history entries have been loaded, but more history exists.              DIABETIC FOOT EXAM (Yearly) Next due on 3/8/2025      03/08/2024  Done              URINE MICROALBUMIN (Yearly) Next due on 3/8/2025      03/08/2024  Microalbumin, Urine component of POC Microalbumin              Ordered - LIPID PANEL (Yearly) Ordered on 9/12/2024 07/31/2024  Lipid Panel    03/08/2024  Lipid Panel    10/23/2023  Lipid Panel    11/10/2022  LIPID PANEL    08/17/2021  LIPID PANEL    Only the first 5 history entries have been loaded, but more history exists.              PAP SMEAR (Every 3 Years) Next due on 3/8/2027  "     03/08/2024  IGP, Aptima HPV, Rfx 16 / 18,45    10/23/2023  Postponed until 12/15/2023 by Susan Fairchild MD (Pending event - scheduled)              TDAP/TD VACCINES (2 - Td or Tdap) Next due on 8/11/2031 08/11/2021  Imm Admin: Tdap              HEPATITIS C SCREENING  Completed      10/13/2017  Hepatitis Panel, Acute              Pneumococcal Vaccine 0-64 (Series Information) Completed      03/08/2024  Postponed until 3/8/2024 by Susan Fairchild MD (Pending event)    03/08/2024  Imm Admin: Pneumococcal Conjugate 20-Valent (PCV20)              INFLUENZA VACCINE  Completed      09/12/2024  Imm Admin: Fluzone  >6mos    10/23/2023  Imm Admin: Fluzone (or Fluarix & Flulaval for VFC) >6mos    11/10/2022  Imm Admin: Fluzone (or Fluarix & Flulaval for VFC) >6mos    10/29/2019  Imm Admin: flucelvax quad pfs =>4 YRS                       Objective   Vitals:    09/12/24 0947   BP: 122/79   Pulse: 72   SpO2: 100%   Weight: 70.3 kg (155 lb)   Height: 157.5 cm (62.01\")      Physical Exam  Vitals reviewed.   Constitutional:       General: She is not in acute distress.     Appearance: Normal appearance.   HENT:      Head: Normocephalic and atraumatic.      Right Ear: Tympanic membrane, ear canal and external ear normal.      Left Ear: Tympanic membrane, ear canal and external ear normal.      Nose: Nose normal. No rhinorrhea.      Mouth/Throat:      Mouth: Mucous membranes are moist.      Pharynx: No posterior oropharyngeal erythema.   Eyes:      Extraocular Movements: Extraocular movements intact.      Conjunctiva/sclera: Conjunctivae normal.      Pupils: Pupils are equal, round, and reactive to light.   Neck:      Comments: No thyromegaly or thyroid nodule on palpation  Cardiovascular:      Rate and Rhythm: Normal rate and regular rhythm.      Pulses: Normal pulses.      Heart sounds: Normal heart sounds. No murmur heard.  Pulmonary:      Effort: Pulmonary effort is normal.      Breath sounds: Normal breath sounds. No " wheezing.   Abdominal:      General: Bowel sounds are normal. There is no distension.      Palpations: Abdomen is soft.      Tenderness: There is no abdominal tenderness.   Musculoskeletal:         General: No tenderness or deformity. Normal range of motion.      Cervical back: Normal range of motion and neck supple.   Lymphadenopathy:      Cervical: No cervical adenopathy.   Skin:     General: Skin is warm and dry.      Capillary Refill: Capillary refill takes less than 2 seconds.      Findings: No lesion or rash.   Neurological:      General: No focal deficit present.      Mental Status: She is alert and oriented to person, place, and time.      Gait: Gait normal.      Deep Tendon Reflexes: Reflexes normal.   Psychiatric:         Mood and Affect: Mood normal.         Behavior: Behavior normal.         Judgment: Judgment normal.          PHQ-9 Depression Screening  Little interest or pleasure in doing things? 0-->not at all   Feeling down, depressed, or hopeless? 0-->not at all   Trouble falling or staying asleep, or sleeping too much?     Feeling tired or having little energy?     Poor appetite or overeating?     Feeling bad about yourself - or that you are a failure or have let yourself or your family down?     Trouble concentrating on things, such as reading the newspaper or watching television?     Moving or speaking so slowly that other people could have noticed? Or the opposite - being so fidgety or restless that you have been moving around a lot more than usual?     Thoughts that you would be better off dead, or of hurting yourself in some way?     PHQ-9 Total Score 0   If you checked off any problems, how difficult have these problems made it for you to do your work, take care of things at home, or get along with other people?       The following data was reviewed by: Susan Fairchild MD on 09/12/2024:  CMP          10/23/2023    00:00 3/8/2024    09:49 7/31/2024    08:27   CMP   Glucose 141  122  138    BUN  11  11  9    Creatinine 0.75  0.74  0.76    Sodium 141  137  137    Potassium 4.4  4.5  4.7    Chloride 103  102  103    Calcium 9.2  9.0  9.1    Total Protein 7.3  6.7  6.8    Albumin 4.6  4.5  4.4    Globulin 2.7  2.2  2.4    Total Bilirubin 0.4  0.5  0.4    Alkaline Phosphatase 68  71  67    AST (SGOT) 12  14  13    ALT (SGPT) 12  13  10    BUN/Creatinine Ratio 15  14.9  11.8      CBC          10/23/2023    00:00 3/8/2024    09:49   CBC   WBC 6.3  4.48    RBC 4.24  4.36    Hemoglobin 13.0  12.6    Hematocrit 38.3  38.6    MCV 90  88.5    MCH 30.7  28.9    MCHC 33.9  32.6    RDW 11.8  12.0    Platelets 277  298      Lipid Panel          10/23/2023    00:00 3/8/2024    09:49 7/31/2024    08:27   Lipid Panel   Total Cholesterol 204  185  192    Triglycerides 50  53  66    HDL Cholesterol 66  63  62    VLDL Cholesterol 9  10  12    LDL Cholesterol  129  112  118      TSH          10/23/2023    00:00   TSH   TSH 2.720      A1C Last 3 Results          10/23/2023    00:00 3/8/2024    09:49 7/31/2024    08:27   HGBA1C Last 3 Results   Hemoglobin A1C 6.5  6.40  6.60    Vit D, microalbumin  Last PCP note    Assessment and Plan  Brittney Justin is a 33 y.o. female presents to [facility] today for annual exam and to discuss health goals/concerns, chart reviewed and updated, medications reviewed, labs reviewed, preventative med reviewed. Answered all questions at this time, pt expressed no further concerns today.     Preventative medicine:   Eye exam: Not up to date.  Ordered  Dental exam: Up to date.    BP: normal  Lipid Panel :   abnormal    A1c:  at goal    Hep C screening: Negative  Colon cancer screening UTD- age 45-75: N/A  Lung cancer screening UTD- age 50-75: N/A  Immunizations UTD: No- declined COVID; Hep B titers pending, if negative will need vaccine series  Anxiety/depression screening: normal  Tobacco cessation counseling: N/A    Women:   Pap age 21-65: Up to date.  Normal 2024- next  due 2029  Mammogram age 40-75:  N/A    Osteoporosis Screen age 65:  N/A      Assessment & Plan  1. Back pain.  She reports discomfort in her lower back and neck, likely due to caregiving activities for her disabled daughter. There are no red flags such as pain radiating down the leg, loss of bowel or bladder control, or leg weakness. Discussed PT vs Chiropractic care as the initial treatment, patient opted for chiropractic care. Home exercises will be provided in the after-visit summary. If symptoms persist, physical therapy or muscle relaxers may be considered.    2. Hyperlipidemia.  Her LDL cholesterol levels are slightly elevated. A prescription for Crestor 5 mg will be provided. She is advised to discontinue the medication and inform the provider immediately if she experiences muscle aches. A follow-up blood test will be scheduled in a month to monitor cholesterol levels.    3. Diabetes Mellitus.  Her A1c has increased slightly from 6.4 to 6.6. She is advised to continue her current metformin regimen. A refill for metformin will be provided. A follow-up blood test will be scheduled in a month to monitor A1c levels. A dilated eye exam will be ordered as it has not been done this year.    4. Migraines.  She reports that her migraines have improved and occur only a couple of times, likely related to hormonal changes. No new treatment is required at this time.    5. Health Maintenance.   Hepatitis B titers will be checked to determine immunity status. The influenza vaccine (Fluzone) will be administered today.    Follow-up  The patient will follow up in 6 months.    Diagnoses and all orders for this visit:    1. Type 2 diabetes mellitus with hyperglycemia, without long-term current use of insulin (Primary)  -     Ambulatory Referral for Diabetic Eye Exam-Optometry  -     metFORMIN ER (GLUCOPHAGE-XR) 500 MG 24 hr tablet; Take 1 tablet by mouth 2 (Two) Times a Day. Indications: Type 2 Diabetes  Dispense: 180 tablet;  Refill: 3  -     CBC & Differential; Future  -     Comprehensive Metabolic Panel; Future  -     Hemoglobin A1c; Future    2. Chronic midline low back pain without sciatica    3. Cervicalgia    4. Moderate mixed hyperlipidemia not requiring statin therapy  -     rosuvastatin (Crestor) 5 MG tablet; Take 1 tablet by mouth Daily. Indications: High Amount of Fats in the Blood  Dispense: 90 tablet; Refill: 3  -     Comprehensive Metabolic Panel; Future  -     Lipid Panel; Future    5. Vitamin D deficiency  -     CBC & Differential; Future  -     Vitamin D,25-Hydroxy; Future    6. Mild hypertension  Overview:  Patient reports she has had intermittently high blood pressure.  But has never been formally diagnosed with hypertension.  Is not checking blood pressure at home      7. Migraine without aura and without status migrainosus, not intractable  Overview:  Associated with periods, around 2 days/month.       8. Immunity status testing  -     Hepatitis B Surface Antibody; Future    9. Encounter for immunization  -     Fluzone >6mos ([id number])    10. Annual physical exam        Patient voiced understanding and agreement with plan of care and had no further questions or concerns at this time.     Problems addressed: 2 or more stable chronic illnesses (MODERATE) new presenting problem: requiring additional assessment, consultation, or diagnostic studies  Complexity: labs ordered yes, labs reviewed yes  Risk: prescription drug management    Susan Fairchild MD  Family Medicine  [facility]      Follow Up  Return in about 6 months (around 3/12/2025), or needs lab visit in 4 weeks, for Recheck.    Patient Instructions   Today: Update screening labs next month; flu shot today.    Meds: Refill     Referrals: Diabetic eye exam    Healthy lifestyle tips  - Reduce intake of processed food (shop perimeter of grocery store), especially white flour and sugar  - Increase intake of fruits/veggies  - Drink 32-64oz of water a  day  - Quit smoking if you smoke  - Drink no more than 2 alcoholic beverages/day if you are male, no more than 1 alcoholic beverage/day if you are female  - Get 6-8 hours of restful sleep at night- poor sleep quality has been linked to increased risk of cardiovascular disease  - Voluntary physical activity cumulative 120-150 minutes/week  - Stress management utilizing meditation and mindfulness (InsightTimer, free chito)  - Keep blood pressure < 140/90    Heart healthy diet from AHA: https://www.heart.org/en/healthy-living/healthy-eating/eat-smart/nutrition-basics/aha-diet-and-lifestyle-recommendations    Diabetes plan:   - be proactive in preventing progression:  continuing to improve diet (decreasing carbs to goal 100-120g/day, monitoring calories, increasing unprocessed fresh foods, and avoiding processed sugar and flour) and 120 minutes of exercise weekly.  - Goal BP <140/90,   - Goal fasting BG   - Goal A1C  <7  - Goal LDL <80  - Ensure annual ophthalmology exam for surveillance of retinopathy  - twice daily foot exams for neuropathy surveillance    Check out [email or web address].[email or web address]     Back pain plan   - Symptomatic relief with heat, ice, Epsom salt soak, OTC Tylenol 1000 mg every 6 hours (max 4000 mg daily), massage  - Home stretches: https://www.hprc-online.org/physical-fitness/rx3/low-back  - may make appt for acupuncture clinic with Dr Fairchild- Tuesdays, cash pay  - usual course of back pain can last up to 4-6 weeks, but should be gradually improving with time.        Patient or patient representative verbalized consent for the use of Ambient Listening during the visit with  Susan Fairchild MD for chart documentation. 9/12/2024  10:24 EDT

## 2024-09-12 NOTE — PATIENT INSTRUCTIONS
Today: Update screening labs next month; flu shot today.    Meds: Refill     Referrals: Diabetic eye exam    Healthy lifestyle tips  - Reduce intake of processed food (shop perimeter of grocery store), especially white flour and sugar  - Increase intake of fruits/veggies  - Drink 32-64oz of water a day  - Quit smoking if you smoke  - Drink no more than 2 alcoholic beverages/day if you are male, no more than 1 alcoholic beverage/day if you are female  - Get 6-8 hours of restful sleep at night- poor sleep quality has been linked to increased risk of cardiovascular disease  - Voluntary physical activity cumulative 120-150 minutes/week  - Stress management utilizing meditation and mindfulness (InsightTimer, free chito)  - Keep blood pressure < 140/90    Heart healthy diet from AHA: https://www.heart.org/en/healthy-living/healthy-eating/eat-smart/nutrition-basics/aha-diet-and-lifestyle-recommendations    Diabetes plan:   - be proactive in preventing progression:  continuing to improve diet (decreasing carbs to goal 100-120g/day, monitoring calories, increasing unprocessed fresh foods, and avoiding processed sugar and flour) and 120 minutes of exercise weekly.  - Goal BP <140/90,   - Goal fasting BG   - Goal A1C  <7  - Goal LDL <80  - Ensure annual ophthalmology exam for surveillance of retinopathy  - twice daily foot exams for neuropathy surveillance    Check out [email or web address].[email or web address]     Back pain plan   - Symptomatic relief with heat, ice, Epsom salt soak, OTC Tylenol 1000 mg every 6 hours (max 4000 mg daily), massage  - Home stretches: https://www.hprc-online.org/physical-fitness/rx3/low-back  - may make appt for acupuncture clinic with Dr Fairchild- Tuesdays, cash pay  - usual course of back pain can last up to 4-6 weeks, but should be gradually improving with time.

## 2025-03-01 DIAGNOSIS — E78.2 MODERATE MIXED HYPERLIPIDEMIA NOT REQUIRING STATIN THERAPY: ICD-10-CM

## 2025-03-03 RX ORDER — ROSUVASTATIN CALCIUM 5 MG/1
5 TABLET, COATED ORAL DAILY
Qty: 90 TABLET | Refills: 3 | Status: SHIPPED | OUTPATIENT
Start: 2025-03-03

## 2025-03-10 ENCOUNTER — OFFICE VISIT (OUTPATIENT)
Dept: FAMILY MEDICINE CLINIC | Facility: CLINIC | Age: 35
End: 2025-03-10
Payer: COMMERCIAL

## 2025-03-10 VITALS
HEIGHT: 62 IN | DIASTOLIC BLOOD PRESSURE: 80 MMHG | RESPIRATION RATE: 18 BRPM | BODY MASS INDEX: 29.79 KG/M2 | OXYGEN SATURATION: 98 % | SYSTOLIC BLOOD PRESSURE: 120 MMHG | WEIGHT: 161.9 LBS | HEART RATE: 74 BPM

## 2025-03-10 DIAGNOSIS — R53.83 OTHER FATIGUE: ICD-10-CM

## 2025-03-10 DIAGNOSIS — Z23 ENCOUNTER FOR IMMUNIZATION: ICD-10-CM

## 2025-03-10 DIAGNOSIS — E11.65 TYPE 2 DIABETES MELLITUS WITH HYPERGLYCEMIA, WITHOUT LONG-TERM CURRENT USE OF INSULIN: Primary | ICD-10-CM

## 2025-03-10 DIAGNOSIS — G43.009 MIGRAINE WITHOUT AURA AND WITHOUT STATUS MIGRAINOSUS, NOT INTRACTABLE: ICD-10-CM

## 2025-03-10 PROCEDURE — 99214 OFFICE O/P EST MOD 30 MIN: CPT | Performed by: FAMILY MEDICINE

## 2025-03-10 PROCEDURE — 90746 HEPB VACCINE 3 DOSE ADULT IM: CPT | Performed by: FAMILY MEDICINE

## 2025-03-10 PROCEDURE — 90471 IMMUNIZATION ADMIN: CPT | Performed by: FAMILY MEDICINE

## 2025-03-10 RX ORDER — METFORMIN HYDROCHLORIDE 500 MG/1
500 TABLET, EXTENDED RELEASE ORAL 2 TIMES DAILY
Qty: 180 TABLET | Refills: 3 | Status: SHIPPED | OUTPATIENT
Start: 2025-03-10

## 2025-03-10 NOTE — PATIENT INSTRUCTIONS
Today: Repeat labs, Hep B vaccine 1 of 3    Meds: Refill Metformin    - be proactive in preventing progression:  continuing to improve diet (decreasing carbs to goal 100-120g/day, monitoring calories, increasing unprocessed fresh foods, and avoiding processed sugar and flour) and 120 minutes of exercise weekly.  - Goal BP <140/90,   - Goal fasting BG   - Goal A1C  <7  - Goal LDL <80  - Ensure annual ophthalmology exam for surveillance of retinopathy  - twice daily foot exams for neuropathy surveillance    Check out [email or web address]  Here is a helpful list of the amount of carbs in common foods: [email or web address]/1libr/MEND/CarbList.pdf

## 2025-03-10 NOTE — PROGRESS NOTES
"Chief Complaint  Chief Complaint   Patient presents with    Diabetes    Hyperlipidemia    Fatigue    Headache       Subjective    History of Present Illness  Brittney Justin is a 34 y.o. female presents to [facility] for followup    History of Present Illness  The patient presents for evaluation of diabetes, headaches, fatigue, and health maintenance.    She is currently doing well on metformin and Crestor for diabetes and high cholesterol.  Denies side effects.  She is trying to watch her diet and increase physical activity as the weather warms up. She had an eye exam this past week.    She reports intermittent nausea, which is not severe and does not appear to be a side effect of her current medication regimen. She has been monitoring her sleep patterns and notes an overall improvement, although with some fluctuations. She has been making efforts to maintain a balanced diet and increase her physical activity.    Her headaches have shown improvement and are now primarily associated with her menstrual cycle. She feels capable of managing these headaches without additional medication.        Objective   Vitals:    03/10/25 0800   BP: 120/80   Pulse: 74   Resp: 18   SpO2: 98%   Weight: 73.4 kg (161 lb 14.4 oz)   Height: 157.5 cm (62.01\")        BMI is >= 25 and <30. (Overweight) The following options were offered after discussion;: exercise counseling/recommendations and nutrition counseling/recommendations       Physical Exam  Vitals reviewed.   Constitutional:       Appearance: Normal appearance.   HENT:      Head: Normocephalic and atraumatic.   Cardiovascular:      Comments: Well perfused  Pulmonary:      Effort: Pulmonary effort is normal. No respiratory distress.   Abdominal:      General: There is no distension.   Musculoskeletal:         General: Normal range of motion.   Feet:      Right foot:      Protective Sensation: 10 sites tested.  10 sites sensed.      Skin integrity: Skin " integrity normal.      Toenail Condition: Right toenails are normal.      Left foot:      Protective Sensation: 10 sites tested.  10 sites sensed.      Skin integrity: Skin integrity normal.      Toenail Condition: Left toenails are normal.   Neurological:      Mental Status: She is alert. Mental status is at baseline.          The following data was reviewed by: Susan Fairchild MD on 03/10/2025:  CMP          7/31/2024    08:27 11/6/2024    09:25   CMP   Glucose 138  134    BUN 9  9    Creatinine 0.76  0.77    EGFR 104.9  103    Sodium 137  139    Potassium 4.7  4.7    Chloride 103  103    Calcium 9.1  9.4    Total Protein 6.8  7.1    Albumin 4.4  4.5    Globulin 2.4  2.6    Total Bilirubin 0.4  0.4    Alkaline Phosphatase 67  75    AST (SGOT) 13  14    ALT (SGPT) 10  13    Albumin/Globulin Ratio 1.8     BUN/Creatinine Ratio 11.8  12      CBC          11/6/2024    09:25   CBC   WBC 5.3    RBC 4.39    Hemoglobin 13.2    Hematocrit 41.2    MCV 94    MCH 30.1    MCHC 32.0    RDW 11.7    Platelets 281      Lipid Panel          7/31/2024    08:27 11/6/2024    09:25   Lipid Panel   Total Cholesterol 192  155    Triglycerides 66  46    HDL Cholesterol 62  68    VLDL Cholesterol 12  10    LDL Cholesterol  118  77      A1C Last 3 Results          7/31/2024    08:27 11/6/2024    09:25   HGBA1C Last 3 Results   Hemoglobin A1C 6.60  6.6        Assessment and Plan  Brittney Justin is a 34 y.o. female presents to [facility] today for followup    Assessment & Plan  1. Diabetes Mellitus.  Her chronic conditions are well-managed at present. A comprehensive set of laboratory tests will be conducted today, including A1c, liver function, kidney function, and urinalysis. The metformin prescription has been renewed.    2. Headaches.  She reports that her headaches are better and occur mainly around her menstrual cycle. She feels she is managing them well and does not require additional medication at this  time.    3. Health Maintenance.  She had her eye exam this past week. She will start the hepatitis B vaccine series today.    Follow-up  The patient will follow up in 6 months for her annual examination.    Diagnoses and all orders for this visit:    1. Type 2 diabetes mellitus with hyperglycemia, without long-term current use of insulin (Primary)  -     Hemoglobin A1c  -     Microalbumin / Creatinine Urine Ratio - Urine, Clean Catch  -     Comprehensive Metabolic Panel  -     metFORMIN ER (GLUCOPHAGE-XR) 500 MG 24 hr tablet; Take 1 tablet by mouth 2 (Two) Times a Day. Indications: Type 2 Diabetes  Dispense: 180 tablet; Refill: 3    2. Other fatigue    3. Migraine without aura and without status migrainosus, not intractable  Overview:  Associated with periods, around 2 days/month.       4. Encounter for immunization  -     Hepatitis B Vaccine Adult IM        Patient voiced understanding and agreement with plan of care and had no further questions or concerns at this time.      Problems addressed: 2 or more stable chronic illnesses (MODERATE)   Complexity: labs ordered yes, labs reviewed yes  Risk: prescription drug management    Susan Fairchild MD  Family Medicine  [facility]      Follow Up  Return in about 6 months (around 9/10/2025) for Annual physical.    Patient Instructions   Today: Repeat labs, Hep B vaccine 1 of 3    Meds: Refill Metformin    - be proactive in preventing progression:  continuing to improve diet (decreasing carbs to goal 100-120g/day, monitoring calories, increasing unprocessed fresh foods, and avoiding processed sugar and flour) and 120 minutes of exercise weekly.  - Goal BP <140/90,   - Goal fasting BG   - Goal A1C  <7  - Goal LDL <80  - Ensure annual ophthalmology exam for surveillance of retinopathy  - twice daily foot exams for neuropathy surveillance    Check out https://diabetes.org/  Here is a helpful list of the amount of carbs in common foods:  [email or web address]          Patient or patient representative verbalized consent for the use of Ambient Listening during the visit with  Susan Fairchild MD for chart documentation. 3/10/2025  08:26 EDT

## 2025-03-11 LAB
ALBUMIN SERPL-MCNC: 4.3 G/DL (ref 3.5–5.2)
ALBUMIN/CREAT UR: 6 MG/G CREAT (ref 0–29)
ALBUMIN/GLOB SERPL: 1.7 G/DL
ALP SERPL-CCNC: 76 U/L (ref 39–117)
ALT SERPL-CCNC: 22 U/L (ref 1–33)
AST SERPL-CCNC: 18 U/L (ref 1–32)
BILIRUB SERPL-MCNC: 0.5 MG/DL (ref 0–1.2)
BUN SERPL-MCNC: 13 MG/DL (ref 6–20)
BUN/CREAT SERPL: 17.8 (ref 7–25)
CALCIUM SERPL-MCNC: 9.3 MG/DL (ref 8.6–10.5)
CHLORIDE SERPL-SCNC: 102 MMOL/L (ref 98–107)
CO2 SERPL-SCNC: 27.9 MMOL/L (ref 22–29)
CREAT SERPL-MCNC: 0.73 MG/DL (ref 0.57–1)
CREAT UR-MCNC: 335.7 MG/DL
EGFRCR SERPLBLD CKD-EPI 2021: 109.5 ML/MIN/1.73
GLOBULIN SER CALC-MCNC: 2.5 GM/DL
GLUCOSE SERPL-MCNC: 136 MG/DL (ref 65–99)
HBA1C MFR BLD: 6.6 % (ref 4.8–5.6)
MICROALBUMIN UR-MCNC: 19.1 UG/ML
POTASSIUM SERPL-SCNC: 4.4 MMOL/L (ref 3.5–5.2)
PROT SERPL-MCNC: 6.8 G/DL (ref 6–8.5)
SODIUM SERPL-SCNC: 139 MMOL/L (ref 136–145)

## 2025-05-09 ENCOUNTER — TELEMEDICINE (OUTPATIENT)
Dept: FAMILY MEDICINE CLINIC | Facility: CLINIC | Age: 35
End: 2025-05-09
Payer: COMMERCIAL

## 2025-05-09 ENCOUNTER — TELEPHONE (OUTPATIENT)
Dept: FAMILY MEDICINE CLINIC | Facility: CLINIC | Age: 35
End: 2025-05-09

## 2025-05-09 DIAGNOSIS — E11.65 TYPE 2 DIABETES MELLITUS WITH HYPERGLYCEMIA, WITHOUT LONG-TERM CURRENT USE OF INSULIN: ICD-10-CM

## 2025-05-09 DIAGNOSIS — M54.50 CHRONIC MIDLINE LOW BACK PAIN WITHOUT SCIATICA: Primary | ICD-10-CM

## 2025-05-09 DIAGNOSIS — G89.29 CHRONIC MIDLINE LOW BACK PAIN WITHOUT SCIATICA: Primary | ICD-10-CM

## 2025-05-09 PROCEDURE — 99213 OFFICE O/P EST LOW 20 MIN: CPT | Performed by: FAMILY MEDICINE

## 2025-05-09 NOTE — TELEPHONE ENCOUNTER
Caller: Brittney Justin    Relationship: Self    Best call back number: [phone]     What was the call regarding: PATIENT HAS SOME MEDICAL QUESTION AND WOULD LIKE SOME ADVICE, IS DEALING WITH LOWER BACK PAIN FEELS ALMOST LIKE CRAMPING, HAS TRIED CHIROPRACTOR BUT HAS NOT SEEM TO HELP THE PROBLEM.     ALSO IS IN NEED OF NEW GLUCOSE MONITOR, CURRENT ONE IS OLD.

## 2025-05-09 NOTE — PROGRESS NOTES
This provider is located in [place], [place] for [facility] (through [facility]), [address] [facility], [facility] [address] using a secure [employer] Video Visit through [employer] Patient is being seen remotely via telehealth at home in [place] and stated they are in a secure environment for this session. The patient's condition being diagnosed/treated is appropriate for telemedicine. The provider identified herself as well as her credentials.. The patient, and/or patients guardian, consent to be seen remotely, and when consent is given they understand that the consent allows for patient identifiable information to be sent to a third party as needed. They may refuse to be seen remotely at any time. The electronic data is encrypted and password protected, and the patient and/or guardian has been advised of the potential risks to privacy not withstanding such measures.     This visit was conducted with use of [employer] audio and video telecommunication system with real time communication between the patient and the provider. Patient consent for virtual visit obtained on 05/09/2025.    Chief Complaint  Chief Complaint   Patient presents with    Back Pain    Diabetes       Subjective    History of Present Illness  Brittney Justin is a 34 y.o. female presents to [facility] for followup of persistent back pain.     Intermittent back pain that has been persistent since September 2024. Pain is cramping, dull ache in the low back in paraspinals, midline. Nothing seems to make it worse. Nothing seems to make it better. Tried chiropractic care, OTC Tylenol/ibuprofen. No radiculopathy, weakness, saddle anesthesia, or loss of bowel/bladder. No UTI symptoms.     Migraine are better. Current med metformin and crestor, she periodically checks her blood sugar and would like to know if she can get a CGM.     Objective   Physical Exam  Constitutional:        General: She is not in acute distress.     Appearance: Normal appearance.   Neurological:      Mental Status: She is alert.          Assessment and Plan  Brittney Justin is a 34 y.o. female presents to [facility] today     Diagnoses and all orders for this visit:    1. Chronic midline low back pain without sciatica (Primary)  Comments:  Hold crestor to see if that helps; will hold muscle relaxer and PT ref for now. Ref to acupuncture.  Orders:  -     Ambulatory Referral For Acupuncture    2. Type 2 diabetes mellitus with hyperglycemia, without long-term current use of insulin  Comments:  Low risk for hypoglycemia or hyperglycemia if following diabetic diet, ok to stop BG checks        Patient voiced understanding and agreement with plan of care and had no further questions or concerns at this time.     Problems addressed:  established problem:  worsening or not responding to treatment, established problem: stable    Susan Fairchild MD  Family Medicine  [facility]      Follow Up  No follow-ups on file.    Patient Instructions   Back pain plan  - Hold Crestor for 2-4 weeks to see if muscle pain improves  - Symptomatic relief with heat, ice, Epsom salt soak, OTC Tylenol 1000 mg every 6 hours (max 4000 mg daily), massage, topical relief with voltaren gel, arnica cream  - Let me know if muscle relaxer needed  - Home stretches: [email or web address]  - Acupuncture- see if [facility] takes your insurance; may make appt for acupuncture clinic with Dr Fairchild- Tuesdays, self pay, usually $60-$100.   - usual course of back pain can last up to 4-6 weeks, but should be gradually improving with time. Come back if no improvement with 4-6 weeks of PT, sooner if symptoms get worse    [email or web address]  [facility]  [address] [place] [address], [place] [address] [phone]

## 2025-05-09 NOTE — PATIENT INSTRUCTIONS
Back pain plan  - Hold Crestor for 2-4 weeks to see if muscle pain improves  - Symptomatic relief with heat, ice, Epsom salt soak, OTC Tylenol 1000 mg every 6 hours (max 4000 mg daily), massage, topical relief with voltaren gel, arnica cream  - Let me know if muscle relaxer needed  - Home stretches: [email or web address]  - Acupuncture- see if [facility] takes your insurance; may make appt for acupuncture clinic with Dr Fairchild- Ger, self pay, usually $60-$100.   - usual course of back pain can last up to 4-6 weeks, but should be gradually improving with time. Come back if no improvement with 4-6 weeks of PT, sooner if symptoms get worse    [email or web address]  [facility]  [address] [place] [address] [phone]

## 2025-08-18 ENCOUNTER — OFFICE VISIT (OUTPATIENT)
Dept: FAMILY MEDICINE CLINIC | Facility: CLINIC | Age: 35
End: 2025-08-18
Payer: COMMERCIAL

## 2025-08-18 VITALS
WEIGHT: 159.4 LBS | DIASTOLIC BLOOD PRESSURE: 80 MMHG | BODY MASS INDEX: 29.33 KG/M2 | SYSTOLIC BLOOD PRESSURE: 120 MMHG | HEIGHT: 62 IN | OXYGEN SATURATION: 99 % | TEMPERATURE: 98.4 F | HEART RATE: 92 BPM

## 2025-08-18 DIAGNOSIS — E78.2 MODERATE MIXED HYPERLIPIDEMIA NOT REQUIRING STATIN THERAPY: ICD-10-CM

## 2025-08-18 DIAGNOSIS — N63.0 MASS OF AXILLARY TAIL OF BREAST: Primary | ICD-10-CM

## 2025-08-18 DIAGNOSIS — Z23 ENCOUNTER FOR IMMUNIZATION: ICD-10-CM

## 2025-08-18 DIAGNOSIS — E11.65 TYPE 2 DIABETES MELLITUS WITH HYPERGLYCEMIA, WITHOUT LONG-TERM CURRENT USE OF INSULIN: ICD-10-CM

## 2025-08-18 PROCEDURE — 90746 HEPB VACCINE 3 DOSE ADULT IM: CPT | Performed by: FAMILY MEDICINE

## 2025-08-18 PROCEDURE — 90471 IMMUNIZATION ADMIN: CPT | Performed by: FAMILY MEDICINE

## 2025-08-18 PROCEDURE — 99214 OFFICE O/P EST MOD 30 MIN: CPT | Performed by: FAMILY MEDICINE

## 2025-08-19 LAB
ALBUMIN SERPL-MCNC: 4.5 G/DL (ref 3.9–4.9)
ALP SERPL-CCNC: 73 IU/L (ref 44–121)
ALT SERPL-CCNC: 14 IU/L (ref 0–32)
AST SERPL-CCNC: 12 IU/L (ref 0–40)
BILIRUB SERPL-MCNC: 0.2 MG/DL (ref 0–1.2)
BUN SERPL-MCNC: 8 MG/DL (ref 6–20)
BUN/CREAT SERPL: 11 (ref 9–23)
CALCIUM SERPL-MCNC: 9.3 MG/DL (ref 8.7–10.2)
CHLORIDE SERPL-SCNC: 101 MMOL/L (ref 96–106)
CHOLEST SERPL-MCNC: 206 MG/DL (ref 100–199)
CO2 SERPL-SCNC: 22 MMOL/L (ref 20–29)
CREAT SERPL-MCNC: 0.74 MG/DL (ref 0.57–1)
EGFRCR SERPLBLD CKD-EPI 2021: 107 ML/MIN/1.73
GLOBULIN SER CALC-MCNC: 2.4 G/DL (ref 1.5–4.5)
GLUCOSE SERPL-MCNC: 156 MG/DL (ref 70–99)
HBA1C MFR BLD: 6.4 % (ref 4.8–5.6)
HDLC SERPL-MCNC: 68 MG/DL
LDLC SERPL CALC-MCNC: 127 MG/DL (ref 0–99)
POTASSIUM SERPL-SCNC: 4.2 MMOL/L (ref 3.5–5.2)
PROT SERPL-MCNC: 6.9 G/DL (ref 6–8.5)
SODIUM SERPL-SCNC: 137 MMOL/L (ref 134–144)
TRIGL SERPL-MCNC: 61 MG/DL (ref 0–149)
VLDLC SERPL CALC-MCNC: 11 MG/DL (ref 5–40)

## 2025-08-20 ENCOUNTER — HOSPITAL ENCOUNTER (EMERGENCY)
Facility: HOSPITAL | Age: 35
Discharge: HOME | End: 2025-08-20
Attending: EMERGENCY MEDICINE
Payer: COMMERCIAL

## 2025-08-20 ENCOUNTER — APPOINTMENT (OUTPATIENT)
Dept: GENERAL RADIOLOGY | Facility: HOSPITAL | Age: 35
End: 2025-08-20
Payer: COMMERCIAL

## 2025-08-29 LAB
NCCN CRITERIA FLAG: NORMAL
TYRER CUZICK SCORE: 12.8